# Patient Record
Sex: FEMALE | Race: WHITE
[De-identification: names, ages, dates, MRNs, and addresses within clinical notes are randomized per-mention and may not be internally consistent; named-entity substitution may affect disease eponyms.]

---

## 2023-08-10 ENCOUNTER — HOSPITAL ENCOUNTER (EMERGENCY)
Dept: HOSPITAL 33 - ED | Age: 48
Discharge: HOME | End: 2023-08-10
Payer: COMMERCIAL

## 2023-08-10 VITALS — SYSTOLIC BLOOD PRESSURE: 118 MMHG | HEART RATE: 82 BPM | DIASTOLIC BLOOD PRESSURE: 74 MMHG

## 2023-08-10 VITALS — OXYGEN SATURATION: 99 % | RESPIRATION RATE: 18 BRPM

## 2023-08-10 VITALS — TEMPERATURE: 98.1 F

## 2023-08-10 DIAGNOSIS — E86.0: ICD-10-CM

## 2023-08-10 DIAGNOSIS — N39.0: Primary | ICD-10-CM

## 2023-08-10 DIAGNOSIS — R51.9: ICD-10-CM

## 2023-08-10 DIAGNOSIS — R11.0: ICD-10-CM

## 2023-08-10 LAB
ALBUMIN SERPL-MCNC: 4.3 G/DL (ref 3.5–5)
ALP SERPL-CCNC: 106 U/L (ref 38–126)
ALT SERPL-CCNC: 19 U/L (ref 0–35)
ANION GAP SERPL CALC-SCNC: 14.4 MEQ/L (ref 5–15)
AST SERPL QL: 27 U/L (ref 14–36)
BACTERIA UR CULT: YES
BASOPHILS # BLD AUTO: 0.04 X10^3/UL (ref 0–0.4)
BASOPHILS NFR BLD AUTO: 0.4 % (ref 0–0.4)
BILIRUB BLD-MCNC: 0.6 MG/DL (ref 0.2–1.3)
BUN SERPL-MCNC: 13 MG/DL (ref 7–17)
CALCIUM SPEC-MCNC: 9 MG/DL (ref 8.4–10.2)
CHLORIDE SERPL-SCNC: 105 MMOL/L (ref 98–107)
CO2 SERPL-SCNC: 23 MMOL/L (ref 22–30)
CREAT SERPL-MCNC: 0.58 MG/DL (ref 0.52–1.04)
EOSINOPHIL # BLD AUTO: 0.03 X10^3/UL (ref 0–0.5)
GFR SERPLBLD BASED ON 1.73 SQ M-ARVRAT: > 60 ML/MIN
GLUCOSE SERPL-MCNC: 121 MG/DL (ref 74–106)
HCT VFR BLD AUTO: 39.4 % (ref 35–47)
HGB BLD-MCNC: 13 G/DL (ref 12–16)
IMM GRANULOCYTES # BLD: 0.06 X10^3U/L (ref 0–0.03)
IMM GRANULOCYTES NFR BLD: 0.5 % (ref 0–0.4)
LYMPHOCYTES # SPEC AUTO: 1.12 X10^3/UL (ref 1–4.6)
MCH RBC QN AUTO: 30 PG (ref 26–32)
MCHC RBC AUTO-ENTMCNC: 33 G/DL (ref 32–36)
MONOCYTES # BLD AUTO: 0.52 X10^3/UL (ref 0–1.3)
NRBC # BLD AUTO: 0 X10^3U/L (ref 0–0.01)
NRBC BLD AUTO-RTO: 0 % (ref 0–0.1)
PLATELET # BLD AUTO: 328 X10^3/UL (ref 150–450)
POTASSIUM SERPLBLD-SCNC: 3.9 MMOL/L (ref 3.5–5.1)
PROT SERPL-MCNC: 7.5 G/DL (ref 6.3–8.2)
RBC # BLD AUTO: 4.33 X10^6/UL (ref 4.1–5.4)
RBC # URNS HPF: (no result) /HPF (ref 0–5)
SODIUM SERPL-SCNC: 138 MMOL/L (ref 137–145)
WBC # BLD AUTO: 11.3 X10^3/UL (ref 4–10.5)
WBC URNS QL MICRO: (no result) /HPF (ref 0–5)

## 2023-08-10 PROCEDURE — 99284 EMERGENCY DEPT VISIT MOD MDM: CPT

## 2023-08-10 PROCEDURE — 96375 TX/PRO/DX INJ NEW DRUG ADDON: CPT

## 2023-08-10 PROCEDURE — 96374 THER/PROPH/DIAG INJ IV PUSH: CPT

## 2023-08-10 PROCEDURE — 36415 COLL VENOUS BLD VENIPUNCTURE: CPT

## 2023-08-10 PROCEDURE — 36000 PLACE NEEDLE IN VEIN: CPT

## 2023-08-10 PROCEDURE — 80053 COMPREHEN METABOLIC PANEL: CPT

## 2023-08-10 PROCEDURE — 81001 URINALYSIS AUTO W/SCOPE: CPT

## 2023-08-10 PROCEDURE — 70450 CT HEAD/BRAIN W/O DYE: CPT

## 2023-08-10 PROCEDURE — 85025 COMPLETE CBC W/AUTO DIFF WBC: CPT

## 2023-08-10 PROCEDURE — 96365 THER/PROPH/DIAG IV INF INIT: CPT

## 2023-08-10 PROCEDURE — 87086 URINE CULTURE/COLONY COUNT: CPT

## 2023-08-10 PROCEDURE — 96360 HYDRATION IV INFUSION INIT: CPT

## 2023-08-10 NOTE — XRAY
Indication: Headache, nausea, and vomiting.



Multiple contiguous axial images obtained through the head without contrast.



Comparison: None



Ventriculosulcal pattern appears symmetric.  Lateral right cerebellum

demonstrates small focus of encephalomalacia presumed from old injury/insult.

No acute intracranial hemorrhage, hydrocephalus, or mass effect.  Fourth

ventricle is midline.  Gray-white matter differentiation preserved.  Bony

calvarium intact.  Visualized paranasal sinuses and mastoid air cells are

clear.



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

injury/insult.  Remaining CT head without contrast exam is negative.

## 2023-08-10 NOTE — ERPHSYRPT
- History of Present Illness


Time Seen by Provider: 08/10/23 13:51


Source: patient, family


Exam Limitations: no limitations


Physician History: 





This is a morbidly obese 48-year-old white female who presents with left facial 

pressure, nasal congestion and left temple pain.  She was brought into the 

emergency department by ambulance.  She was wondering first if she was ov

erheated or possibly has a sinus infection.  She is having the sinus pressure 

for approximately 1 week.  Patient's had some nausea symptoms as well.  She 

denies any acute/traumatic head injury.  She arrives to the emergency department

with her vital signs stable.  Patient has light sensitivity and noise 

sensitivity.


Timing/Duration: today


Head Pain Location: temporal (Left)


Severity of Pain-Max: mild (To moderate)


Severity of Pain-Current: mild (To moderate)


Recent Head Trauma: no recent headache/trauma


Modifying Factors: Improves With: exposure to light, noise


Associated Symptoms: nasal congestion, No confusion, No dizziness, No facial 

pain, No fever/chills, No neck pain


Previous symptoms: no prior history, no recent treatment


Allergies/Adverse Reactions: 








No Known Drug Allergies Allergy (Unverified 08/10/23 13:50)


   








Travel Risk





- International Travel


Have you traveled outside of the country in past 3 weeks: No





- Coronavirus Screening


Are you exhibiting any of the following symptoms?: No


Close contact with a COVID-19 positive Pt in past 14-21 Days: No





- Review of Systems


Constitutional: No Symptoms


Eyes: No Symptoms


Ears, Nose, & Throat: Nose Congestion


Respiratory: No Symptoms


Cardiac: No Symptoms


Abdominal/Gastrointestinal: No Symptoms


Genitourinary Symptoms: No Symptoms


Musculoskeletal: No Symptoms


Skin: No Symptoms


Neurological: Headache


Psychological: No Symptoms


Endocrine: No Symptoms


Hematologic/Lymphatic: No Symptoms


Immunological/Allergic: No Symptoms


All Other Systems: Reviewed and Negative





- Past Medical History


Pertinent Past Medical History: No





- Past Surgical History


Past Surgical History: No





- Nursing Vital Signs


Nursing Vital Signs: 


                               Initial Vital Signs











Temperature  98.1 F   08/10/23 13:47


 


Pulse Rate  87   08/10/23 13:47


 


Respiratory Rate  14   08/10/23 13:47


 


Blood Pressure  120/78   08/10/23 13:47


 


O2 Sat by Pulse Oximetry  97   08/10/23 13:47








                                   Pain Scale











Pain Intensity                 3

















- Physical Exam


General Appearance: no apparent distress, alert, anxiety, obese


Eye Exam: PERRL/EOMI, eyes nml inspection


Ears, Nose, Throat Exam: moist mucous membranes


Neck Exam: normal inspection, non-tender, supple, full range of motion


Respiratory Exam: normal breath sounds, lungs clear, airway intact, No chest 

tenderness, No respiratory distress


Cardiovascular Exam: regular rate/rhythm, normal heart sounds, normal peripheral

pulses


Gastrointestinal/Abdominal Exam: soft, normal bowel sounds, No tenderness


Back Exam: normal inspection, normal range of motion, No CVA tenderness, No 

vertebral tenderness


Extremity Exam: normal inspection, normal range of motion, pelvis stable


Mental Status Exam: alert, oriented x 3, cooperative


CNs Exam: normal hearing, normal speech, PERRL


Motor/Sensory Exam: no motor deficit, no sensory deficit


Skin Exam: normal color, warm, dry


Lymphatic Exam: No adenopathy


**SpO2 Interpretation**: normal


O2 Delivery: Room Air





- Course


Nursing assessment & vital signs reviewed: Yes


Ordered Tests: 


                               Active Orders 24 hr











 Category Date Time Status


 


 Cardiac Monitor STAT Care  08/10/23 14:19 Active


 


 IV Insertion STAT Care  08/10/23 14:19 Active


 


 HEAD WITHOUT CONTRAST [CT] Stat Exams  08/10/23 14:19 Completed


 


 CBC W DIFF Stat Lab  08/10/23 14:30 Completed


 


 CMP Stat Lab  08/10/23 14:30 Completed


 


 CULTURE,URINE Stat Lab  08/10/23 17:08 Received


 


 UA W/RFX UR CULTURE Stat Lab  08/10/23 17:08 Completed








Medication Summary











Generic Name Dose Route Start Last Admin





  Trade Name Freq  PRN Reason Stop Dose Admin


 


Ceftriaxone Sodium/Dextrose  1 g in 50 mls @ 100 mls/hr  08/10/23 17:22 





  Rocephin 1 Gm-D5w 50 Ml Bag**  IV  08/10/23 17:51 





  STAT STA  














Discontinued Medications














Generic Name Dose Route Start Last Admin





  Trade Name Freq  PRN Reason Stop Dose Admin


 


Hydromorphone HCl  0.5 mg  08/10/23 14:19  08/10/23 14:30





  Hydromorphone 1 Mg/1ml Inj***  IV  08/10/23 14:20  0.5 mg





  STAT ONE   Administration


 


Hydromorphone HCl  Confirm  08/10/23 14:27 





  Hydromorphone 1 Mg/1ml Inj***  Administered  08/10/23 14:28 





  Dose  





  1 mg  





  .ROUTE  





  .STK-MED ONE  


 


Sodium Chloride  1,000 mls @ 999 mls/hr  08/10/23 14:19  08/10/23 15:42





  Sodium Chloride 0.9% 1000 Ml  IV  08/10/23 15:19  Infused





  .Q1H1M STA   Infusion


 


Sodium Chloride  Confirm  08/10/23 14:28 





  Sodium Chloride 0.9% 1000 Ml  Administered  08/10/23 14:29 





  Dose  





  1,000 mls @ ud  





  .ROUTE  





  .STK-MED ONE  


 


Ondansetron HCl  4 mg  08/10/23 14:19  08/10/23 14:30





  Ondansetron Hcl 4 Mg/2 Ml Vial  IV  08/10/23 14:20  4 mg





  STAT ONE   Administration


 


Ondansetron HCl  Confirm  08/10/23 14:26 





  Ondansetron Hcl 4 Mg/2 Ml Vial  Administered  08/10/23 14:27 





  Dose  





  4 mg  





  .ROUTE  





  .STK-MED ONE  


 


Pantoprazole Sodium  40 mg  08/10/23 16:52  08/10/23 16:55





  Pantoprazole 40 Mg Vial  IV  08/10/23 16:53  40 mg





  STAT ONE   Administration


 


Pantoprazole Sodium  Confirm  08/10/23 16:54 





  Pantoprazole 40 Mg Vial  Administered  08/10/23 16:55 





  Dose  





  40 mg  





  IV  





  .STK-MED ONE  











Lab/Rad Data: 


                           Laboratory Result Diagrams





                                 08/10/23 14:30 





                                 08/10/23 14:30 





                               Laboratory Results











  08/10/23 08/10/23 08/10/23 Range/Units





  17:08 14:30 14:30 


 


WBC    11.3 H  (4.0-10.5)  x10^3/uL


 


RBC    4.33  (4.1-5.4)  x10^6/uL


 


Hgb    13.0  (12.0-16.0)  g/dL


 


Hct    39.4  (35-47)  %


 


MCV    91.0  ()  fL


 


MCH    30.0  (26-32)  pg


 


MCHC    33.0  (32-36)  g/dL


 


RDW    11.9  (11.5-14.0)  %


 


Plt Count    328  (150-450)  x10^3/uL


 


MPV    10.1  (7.5-11.0)  fL


 


Gran %    84.3 H  (36.0-66.0)  %


 


Immature Gran % (Auto)    0.5 H  (0.00-0.4)  %


 


Nucleat RBC Rel Count    0.0  (0.00-0.1)  %


 


Eos # (Auto)    0.03  (0-0.5)  x10^3/uL


 


Immature Gran # (Auto)    0.06 H  (0.00-0.03)  x10^3u/L


 


Absolute Lymphs (auto)    1.12  (1.0-4.6)  x10^3/uL


 


Absolute Monos (auto)    0.52  (0.0-1.3)  x10^3/uL


 


Absolute Nucleated RBC    0.00  (0.00-0.01)  x10^3u/L


 


Lymphocytes %    9.9 L  (24.0-44.0)  %


 


Monocytes %    4.6  (0.0-12.0)  %


 


Eosinophils %    0.3  (0.00-5.0)  %


 


Basophils %    0.4  (0.0-0.4)  %


 


Absolute Granulocytes    9.57 H  (1.4-6.9)  x10^3/uL


 


Basophils #    0.04  (0-0.4)  x10^3/uL


 


Sodium   138   (137-145)  mmol/L


 


Potassium   3.9   (3.5-5.1)  mmol/L


 


Chloride   105   ()  mmol/L


 


Carbon Dioxide   23   (22-30)  mmol/L


 


Anion Gap   14.4   (5-15)  MEQ/L


 


BUN   13   (7-17)  mg/dL


 


Creatinine   0.58   (0.52-1.04)  mg/dL


 


Estimated GFR   > 60.0   ML/MIN


 


Glucose   121 H   ()  mg/dL


 


Calcium   9.0   (8.4-10.2)  mg/dL


 


Total Bilirubin   0.60   (0.2-1.3)  mg/dL


 


AST   27   (14-36)  U/L


 


ALT   19   (0-35)  U/L


 


Alkaline Phosphatase   106   ()  U/L


 


Serum Total Protein   7.5   (6.3-8.2)  g/dL


 


Albumin   4.3   (3.5-5.0)  g/dL


 


Urine Color  Yellow    (Yellow)  


 


Urine Appearance  Clear    (Clear)  


 


Urine pH  6.5    (4.6-8.0)  


 


Ur Specific Gravity  1.010    (1.005-1.030)  


 


Urine Protein  Negative    (Negative)  


 


Urine Glucose (UA)  Negative    (Negative)  mg/dL


 


Urine Ketones  40 A    (Negative)  


 


Urine Blood  Negative    (Negative)  


 


Urine Nitrite  Negative    (Negative)  


 


Urine Bilirubin  Negative    (Negative)  


 


Urine Urobilinogen  0.2    (0.2)  mg/dL


 


Ur Leukocyte Esterase  Small A    (Negative)  


 


U Hyaline Cast (Auto)  NONE SEEN    (0-2)  /LPF


 


Urine Microscopic RBC  0-2    (0-5)  /HPF


 


Urine Microscopic WBC  6-10 A    (0-5)  /HPF


 


Ur Epithelial Cells  Rare    (None Seen)  /HPF


 


Urine Bacteria  None Seen    (None Seen)  /HPF


 


Urine Culture Reflexed  YES    (NO)  














- Progress


Progress: improved, re-examined


Air Movement: good


Progress Note: 





08/10/23 15:47


CT scan of the head without contrast shows a small focus of encephalomalacia of 

the right cerebellum presumed from an old injury/insult.  The sinuses are all 

clear.





This patient's medical issue is 1 of moderate complexity.  The level complexity 

and the workup performed is based on review of the patient's past medical his

tory, review of the patient's medication list, review the patient's drug allergy

 list, history of present illness and physical findings on examination.  The 

workup in this patient includes a urinalysis, placement of an intravenous line, 

CBC, CMP and CT scan of the head without contrast.  I am awaiting the 

urinalysis.  If there is evidence of an infection we will treat this with an 

antibiotic.  CBC, CMP and CT scan of the head without contrast show no acute, 

emergent abnormality.


08/10/23 17:23


The urinalysis was reviewed in this patient.  She has a urinary tract infection 

which could explain the patient's symptoms.  Will provide her with 1 g of 

Rocephin intravenously followed by outpatient prescription of Cipro 500 mg 

orally twice a day for 7 days which we will remotely send to her pharmacy.


Blood Culture(s) Obtained: No


Counseled pt/family regarding: lab results, diagnosis, need for follow-up, rad 

results





Medical Desision Making





- Independent Historian


Additional History obtained from: Spouse





- Diagnostic Testing


Diagnostic test were ordered, analyzed, and reviewed by me: Yes


Radiological Interpretation: Reviewed by me, Teleradiologist Report





- Risk of complications


The pt has a mod risk of morbidity or mortality based on: Need for prescription 

drug management





- Departure


Departure Disposition: Home


Clinical Impression: 


 Headache, Mild dehydration, Urinary tract infection





Condition: Stable


Critical Care Time: No


Referrals: 


NIXON IGLESIAS [ACTIVE STAFF] - Follow up/PCP as directed


Additional Instructions: 


Drink plenty fluids.  Take your medication as prescribed.  Follow-up with your 

primary care provider for further evaluation and management.


Prescriptions: 


Ciprofloxacin [Cipro 500 MG***] 500 mg PO BID #14 tablet